# Patient Record
Sex: FEMALE | Race: WHITE | NOT HISPANIC OR LATINO | URBAN - NONMETROPOLITAN AREA
[De-identification: names, ages, dates, MRNs, and addresses within clinical notes are randomized per-mention and may not be internally consistent; named-entity substitution may affect disease eponyms.]

---

## 2019-04-04 ENCOUNTER — IMPORTED ENCOUNTER (OUTPATIENT)
Dept: URBAN - NONMETROPOLITAN AREA CLINIC 1 | Facility: CLINIC | Age: 49
End: 2019-04-04

## 2019-04-04 PROBLEM — H35.371: Noted: 2017-02-09

## 2019-04-04 PROBLEM — H40.013: Noted: 2019-04-04

## 2019-04-04 PROBLEM — H25.13: Noted: 2019-04-04

## 2019-04-04 PROCEDURE — 92014 COMPRE OPH EXAM EST PT 1/>: CPT

## 2019-04-04 NOTE — PATIENT DISCUSSION
PLAQUENIL-Mixed connective tissue disease-  Discussed findings of exam in detail with the patient. -  discussed the risk of plaquenil toxicity and the importance of routine follow-up.-No plaquenil toxicity seen on todays exam. Letter to Dr. De La Torre Hunting yet surgical. -Reviewed symptoms of advancing cataract growth such as glare and halos and decreased vision.-Continue to monitor for now. Pt will notify us if any new symptoms develop. Glaucoma Suspect-Based on C:D IOP-C/D 0.75/0.75 OU -IOP 16 OU -Appears stable at this time.-Continue to monitor with exams and testing.-Order HVF 24-2 -Order Fundus photos next visit RTC: n/a HVF 24-2 Fundus photos 1 yr f/u & [place]; Dr's Notes: Pt has appt in April for dilaion. Dr. Gina Larios- Darrian Valadez - 2/9/17VF 10-2- 2/1/18

## 2019-04-19 ENCOUNTER — IMPORTED ENCOUNTER (OUTPATIENT)
Dept: URBAN - NONMETROPOLITAN AREA CLINIC 1 | Facility: CLINIC | Age: 49
End: 2019-04-19

## 2019-04-19 PROBLEM — H52.13: Noted: 2019-04-19

## 2019-04-19 PROBLEM — H40.013: Noted: 2019-04-19

## 2019-04-19 PROBLEM — L94.8: Noted: 2019-04-19

## 2019-04-19 PROBLEM — H52.4: Noted: 2019-04-19

## 2019-04-19 PROBLEM — H35.371: Noted: 2019-04-19

## 2019-04-19 PROBLEM — Z79.899: Noted: 2019-04-19

## 2019-04-19 PROBLEM — H25.13: Noted: 2019-04-19

## 2019-04-19 PROCEDURE — 92310 CONTACT LENS FITTING OU: CPT

## 2019-04-19 PROCEDURE — 92015 DETERMINE REFRACTIVE STATE: CPT

## 2019-04-19 PROCEDURE — 92014 COMPRE OPH EXAM EST PT 1/>: CPT

## 2019-04-19 NOTE — PATIENT DISCUSSION
Simple Myopia OU w/Presbyopia-  discussed findings w/patient-  new spectacle/CL Rx issued-  monitor yearly or prn [facility] (Current Plaquenil use 200mg)-  Educated patient on condition/discussed diagnosis in detail with the patient-  No maculopathy seen on exam today. -  Patient currently taking Plaquenil 200mg PO QD. Patient is working with rheumatologist to taper off of Plaquenil.-  Continue medication as prescribed. -  Continue to monitor every 6 months. -  Will send note to Dr. Boyce Doing-  Monitor 6 month f/u [address] keep testing as scheduled or prn Low Risk Glaucoma Suspect-  discussed findings w/patient-  IOP stable today at 15 OU-  C/D 0.6 OD and 0.45 OS -  Appears stable at this time. -  Continue without drops at this time -  monitor as scheduled or prn; 's Notes: Pt has appt in April for dilaion. Dr. Boyce Doing- Ruthie Hayes - 2/9/17CLAUDIA [place]-2- 2/1/18

## 2019-05-09 ENCOUNTER — IMPORTED ENCOUNTER (OUTPATIENT)
Dept: URBAN - NONMETROPOLITAN AREA CLINIC 1 | Facility: CLINIC | Age: 49
End: 2019-05-09

## 2019-05-09 PROCEDURE — 92134 CPTRZ OPH DX IMG PST SGM RTA: CPT

## 2019-05-09 PROCEDURE — 92083 EXTENDED VISUAL FIELD XM: CPT

## 2019-05-09 NOTE — PATIENT DISCUSSION
Testing [id number] VF (should have done a 10-2VF)OD: R full field no defectsOS: R full field no defectsOCT MacOD: normal (-)maculopathyOS: normal (-)maculopathy; Dr's Notes: Dr. Fei Villegas 5/9/201924-2 VF 5/9/2019

## 2019-10-10 PROBLEM — Z79.899: Noted: 2019-10-10

## 2019-10-10 PROBLEM — H52.4: Noted: 2019-10-10

## 2019-10-10 PROBLEM — H40.013: Noted: 2019-10-10

## 2019-10-10 PROBLEM — H52.13: Noted: 2019-10-10

## 2019-10-10 PROBLEM — H35.371: Noted: 2019-10-10

## 2019-10-10 PROBLEM — H25.13: Noted: 2019-10-10

## 2020-01-30 ENCOUNTER — IMPORTED ENCOUNTER (OUTPATIENT)
Dept: URBAN - NONMETROPOLITAN AREA CLINIC 1 | Facility: CLINIC | Age: 50
End: 2020-01-30

## 2020-01-30 PROCEDURE — 99213 OFFICE O/P EST LOW 20 MIN: CPT

## 2020-01-30 NOTE — PATIENT DISCUSSION
[facility] - discussed diagnosis in detail with patient- reviewed previous testing with patient today - VF was reliable OU and showed full fields OU w/ no defects - OCT mac was normal OU and was (-) maculopathy OU - Recommend patient have testing at least once a year to monitor for changes - April: VF 10-2 OCT mac Presbyopia - Patient to come in April for MR/Contact Lens Exam (no DFE required); Dr's Notes: Dr. Jouse Powers 5/9/201924-2 VF 5/9/2019

## 2021-03-25 ENCOUNTER — IMPORTED ENCOUNTER (OUTPATIENT)
Dept: URBAN - NONMETROPOLITAN AREA CLINIC 1 | Facility: CLINIC | Age: 51
End: 2021-03-25

## 2021-03-25 PROBLEM — H52.13: Noted: 2021-03-25

## 2021-03-25 PROBLEM — H52.221: Noted: 2021-03-25

## 2021-03-25 PROBLEM — H35.371: Noted: 2021-03-25

## 2021-03-25 PROBLEM — Z79.899: Noted: 2021-03-25

## 2021-03-25 PROBLEM — H52.4: Noted: 2021-03-25

## 2021-03-25 PROBLEM — H40.013: Noted: 2021-03-25

## 2021-03-25 PROBLEM — H25.13: Noted: 2021-03-25

## 2021-03-25 PROCEDURE — 92310 CONTACT LENS FITTING OU: CPT

## 2021-03-25 PROCEDURE — 92014 COMPRE OPH EXAM EST PT 1/>: CPT

## 2021-03-25 PROCEDURE — 92015 DETERMINE REFRACTIVE STATE: CPT

## 2021-03-25 NOTE — PATIENT DISCUSSION
Simple Myopia OU w/Presbyopia-  discussed findings w/patient-  new spectacle/CL Rx issued-  monitor yearly or prn [facility] (Current Plaquenil use 200mg)-  Educated patient on condition/discussed diagnosis in detail with the patient-  No maculopathy seen on exam today. -  Patient currently taking Plaquenil 200mg PO QD. Patient is working with rheumatologist to taper off of Plaquenil.-  Continue medication as prescribed. -  Continue to monitor every 6 months. -  Will send note to Dr. Kirt Venegas-  Monitor 6 month f/u [address] keep testing as scheduled or prn Low Risk Glaucoma Suspect-  discussed findings w/patient-  IOP stable today at 15 OU-  C/D 0.6 OD and 0.45 OS -  Appears stable at this time. -  Continue without drops at this time -  monitor as scheduled or prn [facility] - discussed diagnosis in detail with patient- reviewed previous testing with patient today - VF was reliable OU and showed full fields OU w/ no defects - OCT mac was normal OU and was (-) maculopathy OU - Recommend patient have testing at least once a year to monitor for changes - April: VF 10-2 OCT mac Presbyopia - Patient to come in April for MR/Contact Lens Exam (no DFE required); Dr's Notes: Dr. Kirt Venegas- Keenan Sellers 3/25/2021DFE 3/25/2021MAC OCT 5/9/201924-2 VF 5/9/2019

## 2021-05-24 ENCOUNTER — IMPORTED ENCOUNTER (OUTPATIENT)
Dept: URBAN - NONMETROPOLITAN AREA CLINIC 1 | Facility: CLINIC | Age: 51
End: 2021-05-24

## 2021-05-24 PROBLEM — H52.221: Noted: 2021-05-24

## 2021-05-24 PROBLEM — Z79.899: Noted: 2021-05-24

## 2021-05-24 PROBLEM — H25.13: Noted: 2021-05-24

## 2021-05-24 PROBLEM — H52.13: Noted: 2021-05-24

## 2021-05-24 PROBLEM — H35.371: Noted: 2021-05-24

## 2021-05-24 PROBLEM — H52.4: Noted: 2021-05-24

## 2021-05-24 PROBLEM — H40.013: Noted: 2021-05-24

## 2021-05-24 PROCEDURE — 92134 CPTRZ OPH DX IMG PST SGM RTA: CPT

## 2021-05-24 PROCEDURE — 99213 OFFICE O/P EST LOW 20 MIN: CPT

## 2021-05-24 PROCEDURE — 92083 EXTENDED VISUAL FIELD XM: CPT

## 2021-05-24 NOTE — PATIENT DISCUSSION
[facility]-  discussed findings w/patient-  she has been taking Plaquenil x 15 years-  No maculopathy seen on exam today. -  Patient currently taking Plaquenil 200mg PO QD.  Patient is working with rheumatologist to taper off of Plaquenil.-  OCT Mac done 5/24/2021    OD: normal (-)maculopathy    OS: normal (-)maculopathy-  10-2 VF done 5/24/2021    OD: normal (-)VF defects    OS: normal (-)VF defects-  continue to monitor at March 2022 Routine or prn; Dr's Notes: Rheumatologist: Dr. Pancho Banks in [place] at [address] [facility], [phone] for Arthritis and Rheumatic DxMR 3/25/2021DFE 3/25/2021MAC OCT 5/24/202124-2 VF 5/9/201910-2 VF 5/24/2021

## 2021-05-24 NOTE — PATIENT DISCUSSION
[facility]-  discussed findings w/patient-  she has been taking Plaquenil x 15 years-  No maculopathy seen on exam today. -  Patient currently taking Plaquenil 200mg PO QD.  Patient is working with rheumatologist to taper off of Plaquenil.-  OCT Mac done 5/24/2021    OD: normal (-)maculopathy    OS: normal (-)maculopathy-  10-2 VF done 5/24/2021    OD: normal (-)VF defects    OS: normal (-)VF defects-  continue to monitor at March 2022 Routine or prn; Dr's Notes: Rheumatologist: Dr. Janay Llamas in [place] at [address] [facility], [phone] for Arthritis and Rheumatic DxMR 3/25/2021DFE 3/25/2021MAC OCT 5/24/202124-2 VF 5/9/201910-2 VF 5/24/2021

## 2022-04-10 ASSESSMENT — VISUAL ACUITY
OU_CC: 20/20
OS_SC: 20/20
OD_SC: 20/20
OS_SC: 20/20
OD_SC: 20/20
OD_SC: 20/20
OU_CC: 20/20
OD_SC: 20/20
OS_SC: 20/20
OU_SC: 20/20
OD_SC: 20/20
OS_SC: 20/20-
OS_SC: 20/20-1

## 2022-04-10 ASSESSMENT — TONOMETRY
OS_IOP_MMHG: 16
OS_IOP_MMHG: 15
OD_IOP_MMHG: 17
OS_IOP_MMHG: 16
OD_IOP_MMHG: 16
OS_IOP_MMHG: 17
OS_IOP_MMHG: 16
OD_IOP_MMHG: 16
OD_IOP_MMHG: 18
OD_IOP_MMHG: 15